# Patient Record
Sex: FEMALE | Race: WHITE | Employment: FULL TIME | URBAN - METROPOLITAN AREA
[De-identification: names, ages, dates, MRNs, and addresses within clinical notes are randomized per-mention and may not be internally consistent; named-entity substitution may affect disease eponyms.]

---

## 2024-06-16 ENCOUNTER — HOSPITAL ENCOUNTER (EMERGENCY)
Age: 54
Discharge: HOME | End: 2024-06-16
Attending: EMERGENCY MEDICINE
Payer: COMMERCIAL

## 2024-06-16 VITALS
BODY MASS INDEX: 39.72 KG/M2 | WEIGHT: 268.96 LBS | SYSTOLIC BLOOD PRESSURE: 156 MMHG | TEMPERATURE: 98 F | DIASTOLIC BLOOD PRESSURE: 119 MMHG | HEART RATE: 98 BPM | RESPIRATION RATE: 18 BRPM | OXYGEN SATURATION: 100 %

## 2024-06-16 DIAGNOSIS — K62.89 ANAL PAIN: Primary | ICD-10-CM

## 2024-06-16 PROCEDURE — 99283 EMERGENCY DEPT VISIT LOW MDM: CPT

## 2024-06-16 RX ORDER — ENEMA 19; 7 G/133ML; G/133ML
1 ENEMA RECTAL ONCE
Qty: 133 ML | Refills: 0 | Status: SHIPPED | OUTPATIENT
Start: 2024-06-16 | End: 2024-06-16

## 2024-06-16 NOTE — ED TRIAGE NOTES
Pt presents to ED from home c/o being constipated since Friday with a very hard bowel movement and having residual pain. Pt has a hx of hemorrhoids and anal fissures. Pt reports trying to remove the impacted stool digitally, but was unsuccessful. Pt has been taking OTC laxatives for this, reports having a full bowel movement later on Friday evening.  Pt requesting physician note for work d/t pain.     Pt is A&OX4, resting in chair, NAD. Vials show HTN but otherwise WNL.

## 2024-06-16 NOTE — ED PROVIDER NOTES
[facility] ED     Emergency Department     Faculty Attestation        I performed a history and physical examination of the patient and discussed management with the resident. I reviewed the resident’s note and agree with the documented findings and plan of care. Any areas of disagreement are noted on the chart. I was personally present for the key portions of any procedures. I have documented in the chart those procedures where I was not present during the key portions. I have reviewed the emergency nurses triage note. I agree with the chief complaint, past medical history, past surgical history, allergies, medications, social and family history as documented unless otherwise noted below.  For Physician Assistant/ Nurse Practitioner cases/documentation I have personally evaluated this patient and have completed at least one if not all key elements of the E/M (history, physical exam, and MDM). Additional findings are as noted.      Vital Signs: BP: (!) 156/119  Pulse: 98  Respirations: 18  Temp: 98 °F (36.7 °C) SpO2: 100 %  PCP:  Oleg Myers MD  Note Started: 6/16/24, 7:33 AM EDT    Pertinent Comments:     Patient is a 54-year-old female with anal pain with much constipation.   On exam clear external hemorrhoids but no thrombosis or active bleeding.   No obvious anal fissure seen.   Will give stool bulking agent as well as softener and Tucks medicated pads Signet Preparation H caused her pain.    Outpatient surgery referral as well    Critical Care  None      (Please note that portions of this note were completed with a voice recognition program. Efforts were made to edit the dictations but occasionally words are mis-transcribed. Whenever words are used in this note in any gender, they shall be construed as though they were used in the gender appropriate to the circumstances; and whenever words are used in this note in the singular or plural form, 
Rate and Rhythm: Normal rate.      Pulses: Normal pulses.   Pulmonary:      Effort: Pulmonary effort is normal.   Abdominal:      General: Abdomen is flat.      Palpations: Abdomen is soft.   Genitourinary:     Comments: External hemorrhoids noted  Musculoskeletal:         General: Normal range of motion.   Skin:     General: Skin is warm and dry.      Capillary Refill: Capillary refill takes less than 2 seconds.   Neurological:      General: No focal deficit present.      Mental Status: She is alert and oriented to person, place, and time.   Psychiatric:         Mood and Affect: Mood normal.           DDX/DIAGNOSTIC RESULTS / EMERGENCY DEPARTMENT COURSE / MDM     Medical Decision Making  54 y.o. female with PMH of constipation and anal fissures who presents with no pain/discomfort.  Patient explains she was constipated on Friday and ended up having to use digital disimpaction technique in addition to fiber and stool softeners in order to finally have large bowel movement Saturday.  Since then, patient has experienced significant anal pain.  Verbalizes concern that anal pain will prevent her from going to work Monday.     Given patient PMH, HPI, physical exam, differential diagnosis include rectal irritation given digital disimpaction, irritation of existing fissure, hemorrhoid thrombosis, continued constipation.  Patient not wanting conventional analgesics.  Will offer stool softener, fleets enema, Tucks medicated pads and referral to general surgery for evaluation.        EKG      All EKG's are interpreted by the Emergency Department Physician who either signs or Co-signs this chart in the absence of a cardiologist.    EMERGENCY DEPARTMENT COURSE:           PROCEDURES:      CONSULTS:  None    CRITICAL CARE:  There was significant risk of life threatening deterioration of patient's condition requiring my direct management. Critical care time minutes, excluding any documented procedures.    FINAL IMPRESSION      1.

## 2024-06-16 NOTE — DISCHARGE INSTRUCTIONS
You were seen and evaluated for anal pain/discomfort.  Please continue to take stool softeners.  You may use a fleets enema as needed along with Tucks medicated pads.  Consider making appointment with general surgery for removal of hemorrhoids for symptoms.

## 2025-07-12 ENCOUNTER — HOSPITAL ENCOUNTER (EMERGENCY)
Age: 55
Discharge: HOME | End: 2025-07-12
Attending: EMERGENCY MEDICINE
Payer: COMMERCIAL

## 2025-07-12 VITALS
SYSTOLIC BLOOD PRESSURE: 174 MMHG | HEART RATE: 83 BPM | TEMPERATURE: 97.9 F | DIASTOLIC BLOOD PRESSURE: 115 MMHG | OXYGEN SATURATION: 97 % | RESPIRATION RATE: 16 BRPM

## 2025-07-12 DIAGNOSIS — R09.A9 FOREIGN BODY SENSATION IN EAR CANAL, LEFT: Primary | ICD-10-CM

## 2025-07-12 PROCEDURE — 6370000000 HC RX 637 (ALT 250 FOR IP)

## 2025-07-12 PROCEDURE — 99283 EMERGENCY DEPT VISIT LOW MDM: CPT

## 2025-07-12 RX ADMIN — Medication 5 DROP: at 00:48

## 2025-07-12 ASSESSMENT — ENCOUNTER SYMPTOMS
NAUSEA: 0
SHORTNESS OF BREATH: 0

## 2025-07-12 NOTE — DISCHARGE INSTRUCTIONS
You were evaluated for the concern about a bug in your left ear.  At this time, only earwax was visualized.  You were given Debrox eardrops while in the ER and your ear was flushed.  The sensation should resolve on its own.  If you have further concerns, please follow-up with your primary care provider on Monday for reevaluation.    Return to the ER if you have worsening ear pain, pus or other drainage from your ear, fever, or any other concerning symptoms.

## 2025-07-12 NOTE — ED NOTES
Patient to ED via triage with complaints of a possible bug in the left ear. Patient states she woke up and felt something moving in the ear. Patient states EMS tried flushing the ear with water and used oil. Patient states when she does not hold pressure in her ear, she can feel something move. Patient is A&O x4 and RR even and unlabored. Call light within reach

## 2025-07-12 NOTE — ED PROVIDER NOTES
[facility]     Emergency Department     Faculty Attestation    I performed a history and physical examination of the patient and discussed management with the resident. I have reviewed and agree with the resident’s findings including all diagnostic interpretations, and treatment plans as written. Any areas of disagreement are noted on the chart. I was personally present for the key portions of any procedures. I have documented in the chart those procedures where I was not present during the key portions. I have reviewed the emergency nurses triage note. I agree with the chief complaint, past medical history, past surgical history, allergies, medications, social and family history as documented unless otherwise noted below. Documentation of the HPI, Physical Exam and Medical Decision Making performed by lonny is based on my personal performance of the HPI, PE and MDM. For Physician Assistant/ Nurse Practitioner cases/documentation I have personally evaluated this patient and have completed at least one if not all key elements of the E/M (history, physical exam, and MDM). Additional findings are as noted.    Note Started: 12:46 AM EDT     56 yo F fb sensation L ear,   PE tachy, gcs 15 anxious   Cerumen impaction L ear,   --improved  EKG Interpretation    Interpreted by me      CRITICAL CARE: There was a high probability of clinically significant/life threatening deterioration in this patient's condition which required my urgent intervention.  Total critical care time was 0 minutes.  This excludes any time for separately reportable procedures.       Obdulio Izaguirre DO  07/12/25 0046       Obdulio Neal DO  07/12/25 0332

## 2025-07-12 NOTE — ED PROVIDER NOTES
[facility] EMERGENCY DEPARTMENT  Emergency Department Encounter  Emergency Medicine Resident     Pt Name:Jennie Dobbs  MRN: [id number]  Birthdate 1970  Date of evaluation: 25  PCP:  Oleg Myers MD  Note Started: 12:44 AM EDT      CHIEF COMPLAINT       Chief Complaint   Patient presents with    Foreign Body in Ear       HISTORY OF PRESENT ILLNESS  (Location/Symptom, Timing/Onset, Context/Setting, Quality, Duration, Modifying Factors, Severity.)      Jennie Dobbs is a 55 y.o. female who presents to the ED with c/o foreign body in left ear.  Patient states she was sleeping earlier today when she felt like crawling to her left ear.  She called EMS and states they flushed her ear out with 2 glasses of water.  After they left, she again felt a foreign body sensation in her ear, so she came to the ED for evaluation.  Patient denies any fever, chest pain, shortness of breath, nausea, dizziness, lightheadedness.    PAST MEDICAL / SURGICAL / SOCIAL / FAMILY HISTORY      has a past medical history of Fracture.     has a past surgical history that includes Mouth surgery and Ankle fracture surgery (Left, 01/21/15).    Social History     Socioeconomic History    Marital status: Single     Spouse name: Not on file    Number of children: Not on file    Years of education: Not on file    Highest education level: Not on file   Occupational History    Not on file   Tobacco Use    Smoking status: Former     Current packs/day: 0.00     Types: Cigarettes     Quit date: 1995     Years since quittin.4    Smokeless tobacco: Not on file   Substance and Sexual Activity    Alcohol use: No    Drug use: No    Sexual activity: Not on file   Other Topics Concern    Not on file   Social History Narrative    Not on file     Social Drivers of Health     Financial Resource Strain: Not on file   Food Insecurity: Not on file   Transportation Needs: Not on file   Physical Activity: Not on file   Stress: Not on file  NICOLE Dobbs is a 55 y.o. female who presented to the ED with c/o left ear foreign body. While in the department, patient remained hemodynamically stable, afebrile.    Cerumen noted in left ear without obvious foreign body.  Debrox given and ear was flushed.  Some cerumen was flushed out, but patient does have some residual cerumen noted in left ear.  TM now visualized and is intact.  No foreign body identified.    Upon reexamination, patient was resting comfortably, no acute distress.  She states the foreign body sensation is gone.  Her hearing has returned to baseline. Results were discussed with patient. Patient verbalized understanding and agrees with plan for discharge.  Strict return precautions and follow-up information provided.  All questions answered.     CONSULTS:  None    FINAL IMPRESSION      1. Foreign body sensation in ear canal, left          DISPOSITION / PLAN     DISPOSITION Decision To Discharge 07/12/2025 01:59:41 AM   DISPOSITION CONDITION Stable           PATIENT REFERRED TO:  [facility] Emergency Department  [facility] [place] [address]  [phone]    If symptoms worsen    Oleg Myers MD  [address] [place] [address]    Schedule an appointment as soon as possible for a visit         DISCHARGE MEDICATIONS:  Discharge Medication List as of 7/12/2025  1:59 AM          Chari Conley DO  Emergency Medicine Resident    (Please note that portions of this note were completed with a voice recognition program.  Efforts were made to edit the dictations but occasionally words are mis-transcribed.)